# Patient Record
Sex: MALE | Race: WHITE | Employment: FULL TIME | URBAN - METROPOLITAN AREA
[De-identification: names, ages, dates, MRNs, and addresses within clinical notes are randomized per-mention and may not be internally consistent; named-entity substitution may affect disease eponyms.]

---

## 2018-08-09 ENCOUNTER — OFFICE VISIT (OUTPATIENT)
Dept: PHYSICAL THERAPY | Facility: CLINIC | Age: 53
End: 2018-08-09
Payer: COMMERCIAL

## 2018-08-09 DIAGNOSIS — M54.16 LEFT LUMBAR RADICULOPATHY: Primary | ICD-10-CM

## 2018-08-09 PROCEDURE — 97140 MANUAL THERAPY 1/> REGIONS: CPT

## 2018-08-09 PROCEDURE — G8979 MOBILITY GOAL STATUS: HCPCS

## 2018-08-09 PROCEDURE — G8978 MOBILITY CURRENT STATUS: HCPCS

## 2018-08-09 PROCEDURE — 97161 PT EVAL LOW COMPLEX 20 MIN: CPT

## 2018-08-09 NOTE — PROGRESS NOTES
PT Evaluation     Today's date: 2018  Patient name: Leidy Griffith  : 1965  MRN: [id number]  Referring provider: Chen Sanchez  Dx:   Encounter Diagnosis     ICD-10-CM    1  Left lumbar radiculopathy M54 16                   Assessment  Impairments: abnormal or restricted ROM, activity intolerance, impaired physical strength, lacks appropriate home exercise program, pain with function and poor body mechanics    Assessment details: Leidy Griffith is a 50 y o  male was evaluated on 2018 under Direct Access for Left lumbar radiculopathy  (primary encounter diagnosis)  Leidy Griffith has the above listed impairments and will benefit from skilled PT to improve deficits to return to prior level of function  Suspect Heide Hardin to have lumbar disc derangement with extension mechanical bias/directional preference, (+) adverse dural tension test, (-) true neuro signs  Pt educated extensively on spine anatomy with model, disc diurnal variation, centralization/peripheralization of sx, avoiding lumbar flexion with transfers and ADL  HEP initiated  Pt painfree to end tx  Under direct access, the patient can be treated for 30 days without a prescription from the physician  Understanding of Dx/Px/POC: good   Prognosis: good    Goals  2 wks  - No pain > 3/10  - Increase strength 1/3 grade  - Increase lumbar ROM at least 10 deg where applicable  - Increase sitting tolerance at least 50%    4-6 wks  - Pain 0/10  - Strength 5/5  - Lumbar ROM WFL and painfree to include RFIS    - Independent with HEP for self management  - Functional Status Measure at least 81  - Return to baseline tolerance for sitting  - (-) Adverse dural tension testing to include L slump and SLR with sensitization   - Painfree transition to gym for fitness regimen      Plan  Patient would benefit from: skilled physical therapy  Planned modality interventions: thermotherapy: hydrocollator packs and cryotherapy  Planned therapy interventions: joint mobilization, manual therapy, abdominal trunk stabilization, activity modification, flexibility, home exercise program, therapeutic exercise, stretching, strengthening, postural training and patient education  Frequency: 2x week  Duration in visits: 8  Duration in weeks: 4  Treatment plan discussed with: patient        Subjective Evaluation    History of Present Illness  Mechanism of injury: Pt reports 1 month ago bending forward at hips, "I didn't crouch", and pulled on bed linen with LBP, "I just knew I had problems, I couldn't bend over  The next morning I had severe difficulty getting dressed, putting on socks "  Waited 2 wks, LBP improved  Resumed doing "ab exercises, crunches, left elbow to right knee"  Late 2018 pt went on vacation, "by the end of the week, I was freaking out, I couldn't sit down" with pain lateral aspect L lower leg  LLE pain is current chief complaint, not LBP  Valsalva (-)  Denies saddle paresthesias, bowel/bladder changes  Notes soreness plantar aspect B feet over past few weeks  Here under direct access, did not see PCP, no dx testing to this point  Pt previously treated for lumbar disc derangement by this PT, with extension bias  Pain  Current pain ratin  At best pain ratin  At worst pain ratin  Location: Lateral aspect L lower leg  Quality: sharp  Relieving factors: change in position  Aggravating factors: sitting  Progression: worsening    Social Support  Steps to enter house: no  Stairs in house: yes   12  Lives in: multiple-level home  Lives with: spouse and adult children    Employment status: working (Desk job, accounting/purchasing work    Buying stand up desk, on back order x 3 wks)    Diagnostic Tests  No diagnostic tests performed  Treatments  No previous or current treatments  Previous treatment: physical therapy (PT 3-4 yrs ago similar episode)  Patient Goals  Patient goals for therapy: decreased pain, independence with ADLs/IADLs, increased motion and increased strength  Patient goal: To join gym  Currently doing push-ups, walking regimen        Objective    Gait: no AD, normal     Posture: forward head and shoulders, decreased thoracic kyphosis, normal lumbar lordosis  OH squat: increased trunk flexion at hips  Lumbar ROM: flexion 55 deg, L lower leg discomfort, chief complaint reproduced  RFIS with L lower leg discomfort lateral aspect, no worse  Ext 18 deg, painfree  KARIN painfree  EIL, REIL painfree  B SB 20 deg, no effect (NE)   R rotation 81 deg, NE   L rotation 53 deg, NE       MMT: glute max R 5/5, L 4/5  Glute medius R 5/5, L 4+/5  Special tests: B stand march test (-)  No LLD and level ASIS  SLR: R (-) with sensitization, L (+) to lateral aspect L lower leg, no sensitization  L slump test (+), R (-)  B Timothy's (-)  Neuro: pt able to heel walk, toe walk B  DTR with B L3 1/4, S1 2/4 with Jendrassik maneuver  Sensation with BLE intact to sharp/dull discrimination across dermatomes  Lumbosacral myotomes 5/5 B to include iliospoas, quadriceps, anterior tibialis, extensor hallucis, peroneals, and hamstrings  Joint mobilizations:  B UPA, central PA L1-L5 with no effect  Flexibility: B gastroc mildly tight  Hip flexors: R normal, L moderately tight  Hamstrings via SLR: B mildly tight (L SLR (+))          Flowsheet Rows      Most Recent Value   PT/OT G-Codes   Current Score  71   Projected Score  81   Assessment Type  Evaluation   G code set  Mobility: Walking & Moving Around   Mobility: Walking and Moving Around Current Status ()  CJ   Mobility: Walking and Moving Around Goal Status ()  CI          Precautions: PMH skin CA      Daily Treatment Diary       Manuals 8/9/ 18            PA, L UPA L4-5 G3            R lumbar rotation mob             Man txn 30/10"                          Exercise Diary              PPU 10            Stand ext             Stand side glide shldrs to L             TA cx             L U/L bridge             t ball squat             Prone plank             Prone swim             TM/elliptical             FSU                                                                                                                                                                         Modalities

## 2018-08-13 ENCOUNTER — OFFICE VISIT (OUTPATIENT)
Dept: PHYSICAL THERAPY | Facility: CLINIC | Age: 53
End: 2018-08-13
Payer: COMMERCIAL

## 2018-08-13 DIAGNOSIS — M54.16 LEFT LUMBAR RADICULOPATHY: Primary | ICD-10-CM

## 2018-08-13 PROCEDURE — 97140 MANUAL THERAPY 1/> REGIONS: CPT

## 2018-08-13 PROCEDURE — 97110 THERAPEUTIC EXERCISES: CPT

## 2018-08-13 NOTE — PROGRESS NOTES
Daily Note     Today's date: 2018  Patient name: Seun Mcclain  : 1965  MRN: [id number]  Referring provider: Jeremias Vallejo  Dx:   Encounter Diagnosis     ICD-10-CM    1  Left lumbar radiculopathy M54 16                   Subjective: Patient reports generally feels better  Reports had some pain mowing lawn this weekend  Reports worst pain since last visit 6/10 when previously it was a 12/10  Objective: See treatment diary below  Issue and review of HEP which includes prone swim, prone planks, TA cx, and unilateral bridging  Precautions: PMH skin CA      Daily Treatment Diary       Manuals            PA, L UPA L4-5 G3 MELISSA  G3  HJ           R lumbar rotation mob  G3  With cav  HJ           Man txn 30/10"  5'  HJ                        Exercise Diary              PPU 10 x10           Stand ext  x10           Stand side glide shldrs to L  x10           TA cx  10"x  20           L U/L bridge  10"x  20           t ball squat  3x10           Prone plank  10"x  10           Prone swim  2x10           TM/elliptical  10' TM           FSU                                                                                                                                                                         Modalities                                           Assessment: Tolerated treatment well  Patient displayed good techniques with exercises  Good relief of symptoms expressed post manual  Patient appeared to understand HEP  Plan: Continue per plan of care

## 2018-08-17 ENCOUNTER — OFFICE VISIT (OUTPATIENT)
Dept: PHYSICAL THERAPY | Facility: CLINIC | Age: 53
End: 2018-08-17
Payer: COMMERCIAL

## 2018-08-17 DIAGNOSIS — M54.16 LEFT LUMBAR RADICULOPATHY: Primary | ICD-10-CM

## 2018-08-17 PROCEDURE — 97140 MANUAL THERAPY 1/> REGIONS: CPT

## 2018-08-17 PROCEDURE — 97110 THERAPEUTIC EXERCISES: CPT

## 2018-08-20 ENCOUNTER — OFFICE VISIT (OUTPATIENT)
Dept: PHYSICAL THERAPY | Facility: CLINIC | Age: 53
End: 2018-08-20
Payer: COMMERCIAL

## 2018-08-20 DIAGNOSIS — M54.16 LEFT LUMBAR RADICULOPATHY: Primary | ICD-10-CM

## 2018-08-20 PROCEDURE — 97140 MANUAL THERAPY 1/> REGIONS: CPT

## 2018-08-20 PROCEDURE — 97110 THERAPEUTIC EXERCISES: CPT

## 2018-08-20 NOTE — PROGRESS NOTES
Daily Note     Today's date: 2018  Patient name: Chidi Moreno  : 1965  MRN: [id number]  Referring provider: Reinier Small  Dx:   Encounter Diagnosis     ICD-10-CM    1  Left lumbar radiculopathy M54 16                   Subjective: Stand up desk arrived at work on   Currently painfree  "Sitting still bothers me "      Objective: See treatment diary below    Precautions: PMH skin CA      Daily Treatment Diary       Manuals          PA, L UPA L4-5 G3 MELISSA  G3  HJ MELISSA G3 MELISSA G3         R lumbar rotation mob  G3  With cav  HJ G3 with cavit G3 w/ cavit         Man txn 30/10"  5'  HJ 5' 5'                      Exercise Diary              PPU 10 x10 x10 x10         Stand ext  x10 x10 x10         Stand side glide shldrs to L  x10 x10 x10         TA cx  10"x  20 x30 -         L U/L bridge  10"x  20 30 30         t ball squat  3x10 3x10          Prone plank  10"x  10 knees 10"x 10 toes x10         Prone swim  2x10 3x10 3x10         TM/elliptical  10' TM -          FSU   4" 2x10 3x10         Prone pball walk outs    nv                                                                                                                                                        Modalities                                                   Assessment: Tolerated treatment well  Patient exhibited good technique with therapeutic exercises and would benefit from continued PT      Plan: Continue per plan of care

## 2018-08-23 ENCOUNTER — APPOINTMENT (OUTPATIENT)
Dept: PHYSICAL THERAPY | Facility: CLINIC | Age: 53
End: 2018-08-23
Payer: COMMERCIAL

## 2018-08-23 ENCOUNTER — OFFICE VISIT (OUTPATIENT)
Dept: PHYSICAL THERAPY | Facility: CLINIC | Age: 53
End: 2018-08-23
Payer: COMMERCIAL

## 2018-08-23 DIAGNOSIS — M54.16 LEFT LUMBAR RADICULOPATHY: Primary | ICD-10-CM

## 2018-08-23 PROCEDURE — 97140 MANUAL THERAPY 1/> REGIONS: CPT

## 2018-08-23 PROCEDURE — 97110 THERAPEUTIC EXERCISES: CPT

## 2018-08-23 NOTE — PROGRESS NOTES
Daily Note     Today's date: 2018  Patient name: Karl Buckley  : 1965  MRN: [id number]  Referring provider: Jagruti Muskegon  Dx:   Encounter Diagnosis     ICD-10-CM    1  Left lumbar radiculopathy M54 16                   Subjective: pt notes discomfort posterior aspect L knee for the past few days, 1/10  Notes improvement since [facility], "I don't really have that pain down my leg anymore "      Objective: See treatment diary below    Precautions: PMH skin CA      Daily Treatment Diary       Manuals         PA, L UPA L4-5 G3 MELISSA  G3  HJ MELISSA G3 MELISSA G3 MELISSA G3        R lumbar rotation mob  G3  With cav  HJ G3 with cavit G3 w/ cavit G3 w cavit        Man txn 30/10"  5'  HJ 5' 5' 5'                     Exercise Diary              PPU 10 x10 x10 x10 x10        Stand ext  x10 x10 x10 x10        Stand side glide shldrs to L  x10 x10 x10 x10        TA cx  10"x  20 x30 -         L U/L bridge  10"x  20 30 30 30        t ball squat  3x10 3x10  L u/L 3x10        Prone plank  10"x  10 knees 10"x 10 toes x10 10        Prone swim  2x10 3x10 3x10 3x10        TM/elliptical  10' TM -  10'        FSU   4" 2x10 3x10 6" 3x10        Prone pball walk outs    nv 10" x10                                                                                                                                                       Modalities                                                     Assessment: Tolerated treatment well  Patient exhibited good technique with therapeutic exercises  Pain free to end tx  Plan: Continue per plan of care

## 2018-08-27 ENCOUNTER — OFFICE VISIT (OUTPATIENT)
Dept: PHYSICAL THERAPY | Facility: CLINIC | Age: 53
End: 2018-08-27
Payer: COMMERCIAL

## 2018-08-27 DIAGNOSIS — M54.16 LEFT LUMBAR RADICULOPATHY: Primary | ICD-10-CM

## 2018-08-27 PROCEDURE — 97140 MANUAL THERAPY 1/> REGIONS: CPT | Performed by: PHYSICAL THERAPIST

## 2018-08-27 PROCEDURE — 97110 THERAPEUTIC EXERCISES: CPT | Performed by: PHYSICAL THERAPIST

## 2018-08-27 NOTE — PROGRESS NOTES
Daily Note     Today's date: 2018  Patient name: Yo Fallon  : 1965  MRN: [id number]  Referring provider: Sherry Bower  Dx:   Encounter Diagnosis     ICD-10-CM    1  Left lumbar radiculopathy M54 16        Start Time: 729  Stop Time: 819  Total time in clinic (min): 50 minutes    Subjective: Patient notes he continues to feel discomfort over his L knee  Objective: See treatment diary below  Precautions: PMH skin CA        Daily Treatment Diary         Manuals            PA, L UPA L4-5 G3 MELISSA  G3  HJ MELISSA G3 MELISSA G3 MELISSA G3  MELISSA G3           R lumbar rotation mob   G3  With cav  HJ G3 with cavit G3 w/ cavit G3 w cavit  G3 w/ cavit           Man txn 30/10"   5'  HJ 5' 5' 5'  5' RS                                   Exercise Diary                        PPU 10 x10 x10 x10 x10  x10           Stand ext   x10 x10 x10 x10  x10           Stand side glide shldrs to L   x10 x10 x10 x10  x10           TA cx   10"x  20 x30 -               L U/L bridge   10"x  20 30 30 30  30           t ball squat   3x10 3x10   L u/L 3x10  L u/l 3x10           Prone plank   10"x  10 knees 10"x 10 toes x10 10  10           Prone swim   2x10 3x10 3x10 3x10  3x10           TM/elliptical   10' TM -   10'  5'           FSU     4" 2x10 3x10 6" 3x10  6' 3x10           Prone pball walk outs       nv 10" x10  10" x10                                                                                                                                                                                                                                                                                   Modalities                                                                              Assessment: Tolerated treatment well  Patient exhibited good technique with therapeutic exercises and would benefit from continued PT  Patient had improved lumbar mobility post manuals  Plan: Continue per plan of care

## 2018-08-30 ENCOUNTER — OFFICE VISIT (OUTPATIENT)
Dept: PHYSICAL THERAPY | Facility: CLINIC | Age: 53
End: 2018-08-30
Payer: COMMERCIAL

## 2018-08-30 DIAGNOSIS — M54.16 LEFT LUMBAR RADICULOPATHY: Primary | ICD-10-CM

## 2018-08-30 PROCEDURE — 97140 MANUAL THERAPY 1/> REGIONS: CPT

## 2018-08-30 PROCEDURE — 97110 THERAPEUTIC EXERCISES: CPT

## 2018-08-30 NOTE — PROGRESS NOTES
Daily Note     Today's date: 2018  Patient name: Nasrin Luna  : 1965  MRN: [id number]  Referring provider: Catrina Hardin  Dx:   Encounter Diagnosis     ICD-10-CM    1  Left lumbar radiculopathy M54 16        Start Time: 1530  Stop Time: 1630  Total time in clinic (min): 60 minutes    Subjective: Patient reports in the fast few days has felt an improvement in discomfort and now had "minimal pins and needles"  4/10      Objective: See treatment diary below  Precautions: PMH skin CA        Daily Treatment Diary         Manuals          PA, L UPA L4-5 G3 MELISSA  G3  HJ MELISSA G3 MELISSA G3 MELISSA G3  MELISSA G3  KS         R lumbar rotation mob   G3  With cav  HJ G3 with cavit G3 w/ cavit G3 w cavit  G3 w/ cavit           Man txn 30/10"   5'  HJ 5' 5' 5'  5' RS                                   Exercise Diary                        PPU 10 x10 x10 x10 x10  x10  x 10         Stand ext   x10 x10 x10 x10  x10  x 10         Stand side glide shldrs to L   x10 x10 x10 x10  x10  x 10         TA cx   10"x  20 x30 -               L U/L bridge   10"x  20 30 30 30  30  30         t ball squat   3x10 3x10   L u/L 3x10  L u/l 3x10  L u/l 3 x 10         Prone plank   10"x  10 knees 10"x 10 toes x10 10  10  10 x :10         Prone swim   2x10 3x10 3x10 3x10  3x10  3 x 10         TM/elliptical   10' TM -   10'  5'  10'         FSU     4" 2x10 3x10 6" 3x10  6' 3x10  6' 3 x 10         Prone pball walk outs       nv 10" x10  10" x10  10" x 10                                                                                                                                                                                                                                                                                 Modalities                                                                              Assessment: Tolerated treatment well   Patient exhibited good technique with therapeutic exercises and would benefit from continued PT  Patient reports improved symptoms post manual intervention  Plan: Continue per plan of care

## 2018-09-05 ENCOUNTER — OFFICE VISIT (OUTPATIENT)
Dept: PHYSICAL THERAPY | Facility: CLINIC | Age: 53
End: 2018-09-05
Payer: COMMERCIAL

## 2018-09-05 DIAGNOSIS — M54.16 LEFT LUMBAR RADICULOPATHY: Primary | ICD-10-CM

## 2018-09-05 PROCEDURE — 97140 MANUAL THERAPY 1/> REGIONS: CPT

## 2018-09-05 PROCEDURE — 97110 THERAPEUTIC EXERCISES: CPT

## 2018-09-05 NOTE — PROGRESS NOTES
Daily Note     Today's date: 2018  Patient name: Nasrin Luna  : 1965  MRN: [id number]  Referring provider: Catrina Hardin  Dx:   Encounter Diagnosis     ICD-10-CM    1  Left lumbar radiculopathy M54 16                   Subjective: "I went hiking on [facility] on Monday (9/3)  I fell twice because of baez on the rocks but I feel fine "  Notes LE sx largely resolved, "just a little around my left knee but it's minor "  Notes paying attention to using good body mechanics  Agreeable to D/C to HEP next visit with continued good progress  Currently painfree        Objective: See treatment diary below    Precautions: PMH skin CA        Daily Treatment Diary         Manuals        PA, L UPA L4-5 G3 MELISSA  G3  HJ MELISSA G3 MELISSA G3 MELISSA G3  MELISSA G3  KS  MELISSA,L SB G3       R lumbar rotation mob   G3  With cav  HJ G3 with cavit G3 w/ cavit G3 w cavit  G3 w/ cavit    G3 w cavit       Man txn 30/10"   5'  HJ 5' 5' 5'  5' RS    5'                               Exercise Diary                        PPU 10 x10 x10 x10 x10  x10  x 10  x10       Stand ext   x10 x10 x10 x10  x10  x 10  x10       Stand side glide shldrs to L   x10 x10 x10 x10  x10  x 10  x10       TA cx   10"x  20 x30 -               L U/L bridge   10"x  20 30 30 30  30  30  30       t ball squat   3x10 3x10   L u/L 3x10  L u/l 3x10  L u/l 3 x 10  3x10       Prone plank   10"x  10 knees 10"x 10 toes x10 10  10  10 x :10  10"x10       Prone swim   2x10 3x10 3x10 3x10  3x10  3 x 10  3x10       TM/elliptical   10' TM -   10'  5'  10'  10'       FSU     4" 2x10 3x10 6" 3x10  6' 3x10  6' 3 x 10  3x10       Prone pball walk outs       nv 10" x10  10" x10  10" x 10  x10                                                                                                                                                                                                                                                                               Modalities                                                                                    Assessment: Tolerated treatment well  Patient exhibited good technique with therapeutic exercises  Largely asymptomatic  Approaching 30 days of direct access PT  Plan: Progress note during next visit  Potential discharge next visit

## 2018-09-07 ENCOUNTER — OFFICE VISIT (OUTPATIENT)
Dept: PHYSICAL THERAPY | Facility: CLINIC | Age: 53
End: 2018-09-07
Payer: COMMERCIAL

## 2018-09-07 ENCOUNTER — TRANSCRIBE ORDERS (OUTPATIENT)
Dept: PHYSICAL THERAPY | Facility: CLINIC | Age: 53
End: 2018-09-07

## 2018-09-07 DIAGNOSIS — M54.16 LUMBAR RADICULOPATHY: Primary | ICD-10-CM

## 2018-09-07 DIAGNOSIS — M54.16 LEFT LUMBAR RADICULOPATHY: Primary | ICD-10-CM

## 2018-09-07 PROCEDURE — G8978 MOBILITY CURRENT STATUS: HCPCS

## 2018-09-07 PROCEDURE — 97140 MANUAL THERAPY 1/> REGIONS: CPT

## 2018-09-07 PROCEDURE — 97110 THERAPEUTIC EXERCISES: CPT

## 2018-09-07 PROCEDURE — G8979 MOBILITY GOAL STATUS: HCPCS

## 2018-09-07 PROCEDURE — 97164 PT RE-EVAL EST PLAN CARE: CPT

## 2018-09-07 NOTE — PROGRESS NOTES
PT RE-EVALUATION    Today's date: 2018  Patient name: Barbara Whalen  : 1965  MRN: [id number]  Referring provider: Gavino Blackwell  Dx:   Encounter Diagnosis     ICD-10-CM    1  Left lumbar radiculopathy M54 16                   Subjective: approaching the end of 30 days of direct access PT  Pt not sure if he would like to continue PT or be D/C to HEP  Notes occasional discomfort lateral aspect L lower leg  Reports "I'm not confident with what I can do  All I've been doing is walking" re: fitness regimen, does not belong to a gym  Sitting tolerance greatly improved to near baseline, able to sit on couch and at work with no increased symptoms  Objective: See treatment diary below    RE-EVAL:    Pain L lower leg lateral aspect 0-2/10  FOTO Functional score 80 (projected 81, at eval 71)  Higher score = higher function  MMT L glute med, max 5/5  Lumbar ROM: flexion 60 deg, LLE tightness  RFIS with no effect (NE)  Ext 33 deg, NE   SBR 27 deg, NE   SBL 25 deg, NE   R rotation 81 deg, NE   L rotation 63 deg, NE     L SLR, slump: LLE posterior thigh tightness vs R side testing, no radicular sx to L lower leg lateral aspect              Precautions: PMH skin CA        Daily Treatment Diary         Manuals      PA, L UPA L4-5 G3 MELISSA  G3  HJ MELISSA G3 MELISSA G3 MELISSA G3  MELISSA G3  KS  MELISSA,L SB G3  MELISSA, L SB G3     R lumbar rotation mob   G3  With cav  HJ G3 with cavit G3 w/ cavit G3 w cavit  G3 w/ cavit    G3 w cavit  G3     Man txn 30/10"   5'  HJ 5' 5' 5'  5' RS    5'  5'                             Exercise Diary                        PPU 10 x10 x10 x10 x10  x10  x 10  x10  in L SB x10     Stand ext   x10 x10 x10 x10  x10  x 10  x10       Stand side glide shldrs to L   x10 x10 x10 x10  x10  x 10  x10       TA cx   10"x  20 x30 -               L U/L bridge   10"x  20 30 30 30  30  30  30  30     t ball squat   3x10 3x10   L u/L 3x10  L u/l 3x10  L u/l 3 x 10  3x10  3x10     Prone plank   10"x  10 knees 10"x 10 toes x10 10  10  10 x :10  10"x10  10     Prone swim   2x10 3x10 3x10 3x10  3x10  3 x 10  3x10  3x10     TM/elliptical   10' TM -   10'  5'  10'  10'  10'     FSU     4" 2x10 3x10 6" 3x10  6' 3x10  6' 3 x 10  3x10  3x10     Prone pball walk outs       nv 10" x10  10" x10  10" x 10  x10  10      L LAQ nn glide                  10     Supine  HS str                 20"x5                                                                                                                                                                                                                             Modalities                                                                                    Assessment: Tolerated treatment well  Patient exhibited good technique with therapeutic exercises     Karl Marcio has been compliant with attending PT and home exercise program since initial eval   Lesly Chappell  has made improvements in objective data since initial eval but is still limited compared to prior level of function  Lesly Chappell continues with above listed impairments and may benefit from additional skilled PT to address these deficits to return to prior level of function pending progress with HEP, self management  Pt would require PCP PT Rx to continue PT as he was here under direct access to this point  Lesly Chappell has attended 9 visits, missed 0 visits  Goals from Eval 8/9/18:  2 wks  - No pain > 3/10 - met  - Increase strength 1/3 grade - met  - Increase lumbar ROM at least 10 deg where applicable - met  - Increase sitting tolerance at least 50% - met    4-6 wks  - Pain 0/10 - not met  - Strength 5/5 - met  - Lumbar ROM WFL and painfree to include RFIS  - met  - Independent with HEP for self management - met  - Functional Status Measure at least 81 - not met  - Return to baseline tolerance for sitting - met  - (-) Adverse dural tension testing to include L slump and SLR with sensitization   - (+) tightness, not radicular sx  - Painfree transition to gym for fitness regimen - walking only    NEW GOALS:    2 wks  - Pain 0/10  - FOTO at least 81  - SLR, slump asymptomatic LLE    Plan:  PT on hold x 2 wks  Pt to resume normal activities  If symptoms return, to consult with PCP, obtain PT Rx if plan is to resume PT  Otherwise will D/C to HEP

## 2018-09-07 NOTE — LETTER
2018    Juanjo Dorsey, Nat[facility] [place] [address] [facility]  [address] [facility]    Patient: Ramya Matta   YOB: 1965   Date of Visit: 2018     Encounter Diagnosis     ICD-10-CM    1  Left lumbar radiculopathy M54 16        Dear Dr Tika Catherine:    Please review the attached Plan of Care from [facility] recent visit  Please verify that you agree therapy should continue by signing the attached document and sending it back to our office  If you have any questions or concerns, please don't hesitate to call  Sincerely,    Chanelle Schwartz PT      Referring Provider:      I certify that I have read the below Plan of Care and certify the need for these services furnished under this plan of treatment while under my care  Juanjo Dorsey, DO  [address] [address]  VIA Facsimile: [phone]          PT RE-EVALUATION    Today's date: 2018  Patient name: Ramya Matta  : 1965  MRN: [id number]  Referring provider: Alina Rodríguez  Dx:   Encounter Diagnosis     ICD-10-CM    1  Left lumbar radiculopathy M54 16                   Subjective: approaching the end of 30 days of direct access PT  Pt not sure if he would like to continue PT or be D/C to HEP  Notes occasional discomfort lateral aspect L lower leg  Reports "I'm not confident with what I can do  All I've been doing is walking" re: fitness regimen, does not belong to a gym  Sitting tolerance greatly improved to near baseline, able to sit on couch and at work with no increased symptoms  Objective: See treatment diary below    RE-EVAL:    Pain L lower leg lateral aspect 0-2/10  FOTO Functional score 80 (projected 81, at eval 71)  Higher score = higher function  MMT L glute med, max 5/5  Lumbar ROM: flexion 60 deg, LLE tightness  RFIS with no effect (NE)    Ext 33 deg, NE   SBR 27 deg, NE   SBL 25 deg, NE   R rotation 81 deg, NE   L rotation 63 deg, NE     L SLR, slump: LLE posterior thigh tightness vs R side testing, no radicular sx to L lower leg lateral aspect  Precautions: PMH skin CA        Daily Treatment Diary         Manuals 8/9/ 18 8/13 8/17 8/20 8/23 8/27 8/30 9/5 9/7     PA, L UPA L4-5 G3 MELISSA  G3  HJ MELISSA G3 MELISSA G3 MELISSA G3  MELISSA G3  KS  MELISSA,L SB G3  MELISSA, L SB G3     R lumbar rotation mob   G3  With cav  HJ G3 with cavit G3 w/ cavit G3 w cavit  G3 w/ cavit    G3 w cavit  G3     Man txn 30/10"   5'  HJ 5' 5' 5'  5' RS    5'  5'                             Exercise Diary                        PPU 10 x10 x10 x10 x10  x10  x 10  x10  in L SB x10     Stand ext   x10 x10 x10 x10  x10  x 10  x10       Stand side glide shldrs to L   x10 x10 x10 x10  x10  x 10  x10       TA cx   10"x  20 x30 -               L U/L bridge   10"x  20 30 30 30  30  30  30  30     t ball squat   3x10 3x10   L u/L 3x10  L u/l 3x10  L u/l 3 x 10  3x10  3x10     Prone plank   10"x  10 knees 10"x 10 toes x10 10  10  10 x :10  10"x10  10     Prone swim   2x10 3x10 3x10 3x10  3x10  3 x 10  3x10  3x10     TM/elliptical   10' TM -   10'  5'  10'  10'  10'     FSU     4" 2x10 3x10 6" 3x10  6' 3x10  6' 3 x 10  3x10  3x10     Prone pball walk outs       nv 10" x10  10" x10  10" x 10  x10  10      L LAQ nn glide                  10     Supine  HS str                 20"x5                                                                                                                                                                                                                             Modalities                                                                                    Assessment: Tolerated treatment well  Patient exhibited good technique with therapeutic exercises     Claudy Marr has been compliant with attending PT and home exercise program since initial eval   Breana Gomez  has made improvements in objective data since initial eval but is still limited compared to prior level of function   Breana Gomez continues with above listed impairments and may benefit from additional skilled PT to address these deficits to return to prior level of function pending progress with HEP, self management  Pt would require PCP PT Rx to continue PT as he was here under direct access to this point  James Tenzin has attended 9 visits, missed 0 visits  Goals from Katie 8/9/18:  2 wks  - No pain > 3/10  - met  - Increase strength 1/3 grade - met  - Increase lumbar ROM at least 10 deg where applicable - met  - Increase sitting tolerance at least 50% - met    4-6 wks  - Pain 0/10 - not met  - Strength 5/5 - met  - Lumbar ROM WFL and painfree to include RFIS  - met  - Independent with HEP for self management - met  - Functional Status Measure at least 81 - not met  - Return to baseline tolerance for sitting - met  - (-) Adverse dural tension testing to include L slump and SLR with sensitization  - (+) tightness, not radicular sx  - Painfree transition to gym for fitness regimen  - walking only    NEW GOALS:    2 wks  - Pain 0/10  - FOTO at least 81  - SLR, slump asymptomatic LLE    Plan:  PT on hold x 2 wks  Pt to resume normal activities  If symptoms return, to consult with PCP, obtain PT Rx if plan is to resume PT  Otherwise will D/C to HEP

## 2024-10-23 NOTE — PROGRESS NOTES
Daily Note     Today's date: 2018  Patient name: Mary Garcia  : 1965  MRN: [id number]  Referring provider: Blas Flynn  Dx:   Encounter Diagnosis     ICD-10-CM    1  Left lumbar radiculopathy M54 16                   Subjective: "Just discomfort" lateral aspect L lower leg, 2/10  Pt went for long walk outside clinic prior to PT for warm up  Objective: See treatment diary below    Precautions: PMH skin CA      Daily Treatment Diary       Manuals           PA, L UPA L4-5 G3 MELISSA  G3  HJ MELISSA G3          R lumbar rotation mob  G3  With cav  HJ G3 with cavit          Man txn 30/10"  5'  HJ 5'                       Exercise Diary              PPU 10 x10 x10          Stand ext  x10 x10          Stand side glide shldrs to L  x10 x10          TA cx  10"x  20 x30          L U/L bridge  10"x  20 30          t ball squat  3x10 3x10          Prone plank  10"x  10 knees 10"x 10 toes          Prone swim  2x10 3x10          TM/elliptical  10' TM -          FSU   4" 2x10                                                                                                                                                                      Modalities                                               Assessment: Tolerated treatment well  Patient would benefit from continued PT  Pain free to end tx  Plan: Continue per plan of care  151